# Patient Record
Sex: MALE | Race: BLACK OR AFRICAN AMERICAN | NOT HISPANIC OR LATINO | Employment: FULL TIME | URBAN - METROPOLITAN AREA
[De-identification: names, ages, dates, MRNs, and addresses within clinical notes are randomized per-mention and may not be internally consistent; named-entity substitution may affect disease eponyms.]

---

## 2019-09-19 ENCOUNTER — HOSPITAL ENCOUNTER (EMERGENCY)
Facility: HOSPITAL | Age: 24
Discharge: HOME | End: 2019-09-19
Attending: EMERGENCY MEDICINE

## 2019-09-19 VITALS
BODY MASS INDEX: 20.91 KG/M2 | TEMPERATURE: 98 F | HEART RATE: 71 BPM | HEIGHT: 71 IN | SYSTOLIC BLOOD PRESSURE: 116 MMHG | RESPIRATION RATE: 16 BRPM | DIASTOLIC BLOOD PRESSURE: 70 MMHG | OXYGEN SATURATION: 99 % | WEIGHT: 149.38 LBS

## 2019-09-19 DIAGNOSIS — L08.9 INFECTED FINGER LACERATION, INITIAL ENCOUNTER: Primary | ICD-10-CM

## 2019-09-19 DIAGNOSIS — S61.219A INFECTED FINGER LACERATION, INITIAL ENCOUNTER: Primary | ICD-10-CM

## 2019-09-19 PROCEDURE — 90471 IMMUNIZATION ADMIN: CPT | Performed by: PHYSICIAN ASSISTANT

## 2019-09-19 PROCEDURE — 25000003 PHARM REV CODE 250: Performed by: PHYSICIAN ASSISTANT

## 2019-09-19 PROCEDURE — 99284 EMERGENCY DEPT VISIT MOD MDM: CPT | Mod: 25

## 2019-09-19 PROCEDURE — 63600175 PHARM REV CODE 636 W HCPCS: Performed by: PHYSICIAN ASSISTANT

## 2019-09-19 PROCEDURE — 90715 TDAP VACCINE 7 YRS/> IM: CPT | Performed by: PHYSICIAN ASSISTANT

## 2019-09-19 RX ORDER — CEPHALEXIN 500 MG/1
500 CAPSULE ORAL EVERY 8 HOURS
Qty: 21 CAPSULE | Refills: 0 | Status: SHIPPED | OUTPATIENT
Start: 2019-09-19 | End: 2019-09-26

## 2019-09-19 RX ORDER — SULFAMETHOXAZOLE AND TRIMETHOPRIM 800; 160 MG/1; MG/1
2 TABLET ORAL 2 TIMES DAILY
Qty: 28 TABLET | Refills: 0 | Status: SHIPPED | OUTPATIENT
Start: 2019-09-19 | End: 2019-09-26

## 2019-09-19 RX ORDER — SULFAMETHOXAZOLE AND TRIMETHOPRIM 800; 160 MG/1; MG/1
2 TABLET ORAL
Status: COMPLETED | OUTPATIENT
Start: 2019-09-19 | End: 2019-09-19

## 2019-09-19 RX ORDER — CEPHALEXIN 500 MG/1
500 CAPSULE ORAL
Status: COMPLETED | OUTPATIENT
Start: 2019-09-19 | End: 2019-09-19

## 2019-09-19 RX ADMIN — CEPHALEXIN 500 MG: 500 CAPSULE ORAL at 01:09

## 2019-09-19 RX ADMIN — CLOSTRIDIUM TETANI TOXOID ANTIGEN (FORMALDEHYDE INACTIVATED), CORYNEBACTERIUM DIPHTHERIAE TOXOID ANTIGEN (FORMALDEHYDE INACTIVATED), BORDETELLA PERTUSSIS TOXOID ANTIGEN (GLUTARALDEHYDE INACTIVATED), BORDETELLA PERTUSSIS FILAMENTOUS HEMAGGLUTININ ANTIGEN (FORMALDEHYDE INACTIVATED), BORDETELLA PERTUSSIS PERTACTIN ANTIGEN, AND BORDETELLA PERTUSSIS FIMBRIAE 2/3 ANTIGEN 0.5 ML: 5; 2; 2.5; 5; 3; 5 INJECTION, SUSPENSION INTRAMUSCULAR at 01:09

## 2019-09-19 RX ADMIN — SULFAMETHOXAZOLE AND TRIMETHOPRIM 2 TABLET: 800; 160 TABLET ORAL at 01:09

## 2019-09-19 NOTE — ED PROVIDER NOTES
History      Chief Complaint   Patient presents with    Hand Injury     states he cut his hand on glass 2 weeks ago and area started swelling last night. redness and swellnig noted to area        Review of patient's allergies indicates:  No Known Allergies     HPI   HPI    9/19/2019, 12:45 PM   History obtained from the patient      History of Present Illness: Clarence Mg is a 23 y.o. male patient who presents to the Emergency Department for infected wound to right middle finger.  He cut on glass 2 weeks ago. Symptoms are moderate in severity.     No further complaints or concerns at this time.           PCP: Primary Doctor No       Past Medical History:  No past medical history on file.      Past Surgical History:  No past surgical history on file.        Family History:  No family history on file.        Social History:  Social History     Tobacco Use    Smoking status: Not on file   Substance and Sexual Activity    Alcohol use: Not on file    Drug use: Not on file    Sexual activity: Not on file       ROS     Review of Systems   Constitutional: Negative for chills and fever.   HENT: Negative for facial swelling and trouble swallowing.    Eyes: Negative for pain and discharge.   Respiratory: Negative for chest tightness and shortness of breath.    Cardiovascular: Negative for palpitations and leg swelling.   Gastrointestinal: Negative for diarrhea and vomiting.   Endocrine: Negative for polydipsia and polyuria.   Genitourinary: Negative for decreased urine volume and flank pain.   Musculoskeletal: Negative for joint swelling and neck stiffness.   Skin: Positive for wound. Negative for rash.   Neurological: Negative for syncope and light-headedness.   All other systems reviewed and are negative.      Physical Exam      Initial Vitals [09/19/19 1159]   BP Pulse Resp Temp SpO2   116/70 71 16 98 °F (36.7 °C) 99 %      MAP       --         Physical Exam  Vital signs and nursing notes  "reviewed.  Constitutional: Patient is in NAD. Awake and alert. Well-developed and well-nourished.  Head: Atraumatic. Normocephalic.  Eyes: PERRL. EOM intact. Conjunctivae nl. No scleral icterus.  ENT: Mucous membranes are moist. Oropharynx is clear.  Neck: Supple. No JVD. No lymphadenopathy.  No meningismus  Cardiovascular: Regular rate and rhythm. No murmurs, rubs, or gallops. Distal pulses are 2+ and symmetric.  Pulmonary/Chest: No respiratory distress. Clear to auscultation bilaterally. No wheezing, rales, or rhonchi.  Abdominal: Soft. Non-distended. No TTP. No rebound, guarding, or rigidity. Good bowel sounds.  Genitourinary: No CVA tenderness  Musculoskeletal: Moves all extremities.  Infected wound to right middle finger, see image.  FROM of finger.  Normal sensation, and cap refill less than 2          Skin: Warm and dry.  Neurological: Awake and alert. No acute focal neurological deficits are appreciated.  Psychiatric: Normal affect. Good eye contact. Appropriate in content.      ED Course          Procedures  ED Vital Signs:  Vitals:    09/19/19 1159 09/19/19 1345   BP: 116/70    Pulse: 71    Resp: 16    Temp: 98 °F (36.7 °C) 98.1 °F (36.7 °C)   TempSrc: Oral    SpO2: 99%    Weight: 67.8 kg (149 lb 5.8 oz)    Height: 5' 11" (1.803 m)                  Imaging Results:  Imaging Results          X-Ray Hand 3 View Right (Final result)  Result time 09/19/19 12:23:01    Final result by Jona Guzman MD (09/19/19 12:23:01)                 Impression:      No acute fracture or dislocation.      Electronically signed by: Jona Guzman MD  Date:    09/19/2019  Time:    12:23             Narrative:    EXAMINATION:  XR HAND COMPLETE 3 VIEW RIGHT    CLINICAL HISTORY:  XR HAND COMPLETE 3 VIEW RIGHT    COMPARISON:  None    FINDINGS:  Three views of the right hand were obtained.    No evidence of acute fracture or dislocation.  Bony mineralization is normal.  Soft tissues are unremarkable.                             "       The Emergency Provider reviewed the vital signs and test results, which are outlined above.    ED Discussion             Medication(s) given in the ER:  Medications   Tdap vaccine injection 0.5 mL (0.5 mLs Intramuscular Given 9/19/19 1329)   sulfamethoxazole-trimethoprim 800-160mg per tablet 2 tablet (2 tablets Oral Given 9/19/19 1329)   cephALEXin capsule 500 mg (500 mg Oral Given 9/19/19 1329)           Follow-up Information     [facility] - .    Specialty:  Emergency Medicine  Why:  If symptoms worsen  Contact information:  [address] [facility] [place] [facility] [address] [phone]           [facility] In 2 days.    Contact information:  [address] [facility] [address] [phone]                          Medication List      START taking these medications    cephALEXin 500 MG capsule  Commonly known as:  KEFLEX  Take 1 capsule (500 mg total) by mouth every 8 (eight) hours. for 7 days     sulfamethoxazole-trimethoprim 800-160mg 800-160 mg Tab  Commonly known as:  BACTRIM DS  Take 2 tablets by mouth 2 (two) times daily. for 7 days           Where to Get Your Medications      You can get these medications from any pharmacy    Bring a paper prescription for each of these medications  · cephALEXin 500 MG capsule  · sulfamethoxazole-trimethoprim 800-160mg 800-160 mg Tab             Medical Decision Making        All findings were reviewed with the patient/family in detail.   All remaining questions and concerns were addressed at that time.  Patient/family has been counseled regarding the need for follow-up as well as the indication to return to the emergency room should new or worrisome developments occur.        MDM               Clinical Impression:        ICD-10-CM ICD-9-CM   1. Infected finger laceration, initial encounter S61.219A 883.1    L08.9              Chanda Castillo PA-C  09/19/19 [id number]